# Patient Record
Sex: MALE | Race: WHITE
[De-identification: names, ages, dates, MRNs, and addresses within clinical notes are randomized per-mention and may not be internally consistent; named-entity substitution may affect disease eponyms.]

---

## 2021-02-26 ENCOUNTER — HOSPITAL ENCOUNTER (OUTPATIENT)
Dept: HOSPITAL 47 - RADXRYALE | Age: 61
Discharge: HOME | End: 2021-02-26
Attending: FAMILY MEDICINE
Payer: MEDICARE
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
## 2021-02-26 DIAGNOSIS — M51.36: ICD-10-CM

## 2021-02-26 DIAGNOSIS — M79.622: ICD-10-CM

## 2021-02-26 DIAGNOSIS — M50.30: Primary | ICD-10-CM

## 2021-02-26 DIAGNOSIS — M79.642: ICD-10-CM

## 2021-02-26 PROCEDURE — 72110 X-RAY EXAM L-2 SPINE 4/>VWS: CPT

## 2021-02-26 PROCEDURE — 72050 X-RAY EXAM NECK SPINE 4/5VWS: CPT

## 2021-02-27 NOTE — XR
EXAMINATION TYPE: XR lumbosacral spine min 4V

 

DATE OF EXAM: 2/26/2021

 

CLINICAL HISTORY: ,,[id number] .pain.

 

 

TECHNIQUE: Frontal, lateral, and oblique images of the lumbar spine are obtained.

 

COMPARISON: None

 

FINDINGS:  There are 5 lumbar type vertebral bodies identified. There is grade 1 anterolisthesis of L
4 on L5. There is grade 1 retrolisthesis of L3 on L4. Mild disc space narrowing posterior L3-L4 level
. Mild disc space narrowing L4-L5 level. Mild multilevel anterior and lateral spurring in the mid to 
lower lumbar spine. Oblique images appear within normal limits. Bilateral pars defect L5 level not cl
early identified. Facet arthropathy lower lumbar levels. Mild vascular calcification of the overlying
 abdominal aorta.

 

IMPRESSION: As above.

## 2021-02-27 NOTE — XR
EXAMINATION TYPE: XR cervical spine comp

 

DATE OF EXAM: 2/26/2021

 

TECHNIQUE: Frontal, lateral, oblique, swimmer's, and open mouth view of the cervical spine are obtain
ed.

 

HISTORY:  ,,[id number]

 

COMPARISON: None

 

FINDINGS: There is loss of normal cervical curvature.  There is anterior fusion plate at C6-C7 level 
with some ossific fusion. There is mild to moderate disc space narrowing with moderate to severe ante
rior spurring C3-C4 through the C5-C6 levels. The pre-vertebral soft tissue appears within normal iqbal
its.  The C1-C2 articulation is within normal limits on the open mouth view. Suboptimal evaluation of
 C7-T1 level despite attempted swimmer's view. Slight scoliotic curvature positioning on frontal view
. Oblique images show some multilevel uncovertebral facet spurring contributing to multilevel bilater
al neural foraminal narrowing. Overlying soft tissue is unremarkable.

 

IMPRESSION: As above.

## 2021-02-27 NOTE — XR
EXAMINATION TYPE: XR hand limited LT

 

DATE OF EXAM: 2/26/2021

 

CLINICAL HISTORY: Fall injury 3 weeks ago with pain worse over third finger

 

TECHNIQUE: Frontal and lateral images of the left hand are obtained.

 

COMPARISON: None.

 

FINDINGS:  There is no acute fracture/dislocation evident in the left hand with particular attention 
to the third finger. Mild to moderate narrowing throughout the PIP and DIP joints of the fingers. No 
significant spurring. Relative sparing of MCP joints.  The overlying soft tissue appears unremarkable
.

 

IMPRESSION:  There is no acute fracture or dislocation in the left hand.

## 2021-02-27 NOTE — XR
EXAMINATION TYPE: XR humerus LT

 

DATE OF EXAM: 2/26/2021

 

CLINICAL HISTORY: Pain after fall injury 3 weeks ago.

 

TECHNIQUE:  Two views of the left humerus are obtained.

 

COMPARISON: None.

 

FINDINGS: Osseous structures are demineralized which is noted to lower radiographic sensitivity. In a
ddition there is overlying artifact from clothing material. There is deformity to the humeral head styles
spected and glenohumeral joint arthropathy. Some overlying linear lucency without cortical step off s
uspected artifact. There is however more suspicious focal sclerosis of the intramedullary portion of 
the proximal to mid diaphysis left humerus. Cannot exclude bone lesion at this level. No acute or sub
acute displaced fracture clearly seen.

 

IMPRESSION: As above.  Follow-up advised.

 

A Yellow level critical message alert has been initiated for Robert Joya DO via the [employer] Critical Results System on 2/27/2021 11:36 AM.  This message alert has been sent to Robert bridges DO via the preferences provided by the clinician for the receipt of Radiology Critical Findings
. Message ID [id number].

## 2021-05-17 ENCOUNTER — HOSPITAL ENCOUNTER (OUTPATIENT)
Dept: HOSPITAL 47 - PNWHC3 | Age: 61
End: 2021-05-17
Attending: ANESTHESIOLOGY
Payer: MEDICARE

## 2021-05-17 VITALS
SYSTOLIC BLOOD PRESSURE: 171 MMHG | DIASTOLIC BLOOD PRESSURE: 77 MMHG | RESPIRATION RATE: 16 BRPM | HEART RATE: 78 BPM | TEMPERATURE: 98.2 F

## 2021-05-17 DIAGNOSIS — E78.5: ICD-10-CM

## 2021-05-17 DIAGNOSIS — Z79.01: ICD-10-CM

## 2021-05-17 DIAGNOSIS — M51.36: Primary | ICD-10-CM

## 2021-05-17 DIAGNOSIS — Z79.899: ICD-10-CM

## 2021-05-17 DIAGNOSIS — M48.061: ICD-10-CM

## 2021-05-17 DIAGNOSIS — M47.816: ICD-10-CM

## 2021-05-17 DIAGNOSIS — E11.9: ICD-10-CM

## 2021-05-17 DIAGNOSIS — I10: ICD-10-CM

## 2021-05-17 PROCEDURE — 99211 OFF/OP EST MAY X REQ PHY/QHP: CPT

## 2021-05-17 NOTE — P.PAINCN
History of Present Illness





- Reason for Consult


Consult date: 05/17/21





- History of Present Illness


This is 61 years old male with a chronic history of severe low back pain, after 

several years ago patient the pain is constant and increases with any activity 

interference with the quality of life , she had back surgery in the past and he 

continued to have severe low back pain, patient feels some weakness in his lower

extremity , he ambulates using cane, the intensity of the pain 6/10 increased to

10 over 10 with any activity, denies any change in the bowel movement or 

urination he denies any fever or night sweats





Past Medical History


Past Medical History: Diabetes Mellitus, Eye Disorder, Hyperlipidemia, 

Hypertension


History of Any Multi-Drug Resistant Organisms: None Reported


Past Surgical History: Back Surgery


Additional Past Surgical History / Comment(s): BACK SX X 2.  COLONOSCOPY.  BLE 

VEIN STRIPPING.  RT RETINAL REPAIR


Past Anesthesia/Blood Transfusion Reactions: No Reported Reaction


Past Psychological History: Anxiety


Smoking Status: Never smoker


Past Alcohol Use History: None Reported


Past Drug Use History: None Reported





- Past Family History


  ** Mother


Family Medical History: Cancer





Medications and Allergies


                                Home Medications











 Medication  Instructions  Recorded  Confirmed  Type


 


Apixaban [Eliquis] 5 mg PO BID 05/17/21 05/17/21 History


 


Atorvastatin [Lipitor] 40 mg PO DAILY 05/17/21 05/17/21 History


 


Carvedilol [Coreg] 6.25 mg PO BID 05/17/21 05/17/21 History


 


Cetirizine HCl 10 mg PO DAILY 05/17/21 05/17/21 History


 


Ferrous Sulfate [Iron] 325 mg PO DAILY 05/17/21 05/17/21 History


 


Finasteride [Proscar] 5 mg PO DAILY 05/17/21 05/17/21 History


 


Gabapentin 600 mg PO DAILY 05/17/21 05/17/21 History


 


Magnesium 250 mg PO DAILY 05/17/21 05/17/21 History


 


Spironolactone 25 mg PO DAILY 05/17/21 05/17/21 History


 


Tamsulosin [Flomax] 0.4 mg PO DAILY 05/17/21 05/17/21 History


 


metOLazone [Zaroxolyn] 5 mg PO DAILY 05/17/21 05/17/21 History








                                    Allergies











Allergy/AdvReac Type Severity Reaction Status Date / Time


 


No Known Allergies Allergy   Verified 05/12/21 12:12














Physical Exam


Vitals: 


                                   Vital Signs











  Temp Pulse Resp BP Pulse Ox


 


 05/17/21 09:13  98.2 F  78  16  171/77  98











    


   Physical Examinations  :


    -Constitutiona       : Cooperative , not in acute distress .


    -HEENT                :  nech :  supple ,  no Lymphadenopathy  , normal  

thyroid  size .


                               :   eyes  :  no ptosis , no icterus,  no 

photophobia .                                                                   

                                                                                

                                                                                

                                                                                

                                                                   


    - neurologic         :   Cranial nerve II   to  XII  intact ,  no   focal 

neurological deffecit  .


    -psychatric          : alert ,  oriented  X 3  ,   appropriate affect   , in

tact judgment  and insight  .  


    -Lymphatic          :    no Lymphadenopathy .


   - musculoskeltal   :     


                                 Cervical Spine 


                                         motor  stregnth in the deltoid and 

biceps,   normal   right side    ,  normal  Left side


                                         motor  stregnth biceps and the wrist 

extensors   normal right side ,normal left side .


                                         motor stregnth in the triceps muscle . 

normal  Right side , normal  Left side  


                                         deep tendon reflexes  normal   at the 

biceps ,   normal  at Brachioradialis , normal  at triceps.


                                         cervical facet loading test: Positive 

Bilaterally


                                         Spurling test= positive Right , 

positive left.


                                         Neck distraction test= positive Right ,

 positive left.


                                         Cynthia sign= positive right, positive

 left .


                                   Lumber spine


                                         moter stegnth lower extremities ,thigh 

and legs  4/5 Right side ,  4/5  Left side 


                                         deep tendon reflexes :   normal  Knee 

Jerk    , normal   ankle Jerk  


                                         lumber facet Loading Test =positive 

Right , positive Left 


                                         Range of motion of the lumbar spine  

Flexion  30 degrees,   extension   10 degrees


                                         strait leg raising test = positive at  

 30 degree   


                                          Fabere test= positive Right ,    and  

positive  LT .


                                          Sever tenderness over the  Sacroiliac 

joint  on the Right , and Left  sides   


                                          Gaenslen  test= positive right ,and 

positive left .


                                          Seated flexion test= positive right 

,and positive Left .


                                          Distraction test= positive bilaterally





Results


Comments: 


MRI of the lumbar spine= diffuse lumbar bulging disc disease, central canal 

stenosis, severe bilateral foraminal narrowing, lumbar spondylosis with lumbar 

facet arthropathy.


MRI of the cervical spine= C2 3 facet narrowing, C3 4 facet joint narrowing, and

 C4 5 facet joint arthropathy and central canal narrowing





Assessment and Plan


Plan: 


Assessment and plan=1-lumbar degenerative disc disease.


                               2-lumbar spondylosis with lumbar facet 

arthropathy.


                               3-Lumbar spinal stenosis.


                               4-poorly controlled diabetes.


                               5-chronic use of ELIQUIS


                                Patient will be good candidate to have 

diagnostic medial branch block lumbar area L3,L4, L5 bilaterally , possible RFA


Time with Patient: Greater than 30





PQRS Measure Charge Sheet


Measure #130: Documentation of Current Meds in Medical Chart: Patient's 

medications documented in chart


Measure #226: Tobacco Use: Screen & Cessation Intervention: Pt not a tobacco 

user


Measure #111: Pneumonia Vaccination: Pneumococcal vaccine NOT administered or 

previously given


Measure #47: Advance Care Plan: Advance care planning discussed & documented, pt

 chose/unable to give


Measure #412: Opioid Treatment Agreement: No documentation of signed opioid 

treatment agreement


Measure #408: Opioid Therapy Follow-up Evaluation: Patient had NO f/u eval 

minimum every 3 months during opioid therapy


Measure #317: Preventitive Care & Scrn High Bld Press & F/U: Pre-hypertensive or

 hypertensive BP documented, pt will f/u with PCP


Measure #128: Body Mass Index (BMI) Screening & Follow-up: BMI documented ABOVE 

normal parameters - f/u documented


Measure #131: Pain Assessment & Follow-up: Pain positive & plan documented, 

Follow-up scheduled


Measure #431: Unhealthy Alcohol Use Preventative Care & Scrn: Patient not 

identified as an unhealthy alcohol user


PQRS Narrative: 


                                        





Blood Pressure                   171/77


Pain Intensity [Lower Back]      0


Scale Used                       Numeric (1 - 10)


Hx Alcohol Use (MH)              No








Home Medications: 


Ambulatory Orders





Apixaban [Eliquis] 5 mg PO BID 05/17/21 


Atorvastatin [Lipitor] 40 mg PO DAILY 05/17/21 


Carvedilol [Coreg] 6.25 mg PO BID 05/17/21 


Cetirizine HCl 10 mg PO DAILY 05/17/21 


Ferrous Sulfate [Iron] 325 mg PO DAILY 05/17/21 


Finasteride [Proscar] 5 mg PO DAILY 05/17/21 


Gabapentin 600 mg PO DAILY 05/17/21 


Magnesium 250 mg PO DAILY 05/17/21 


Spironolactone 25 mg PO DAILY 05/17/21 


Tamsulosin [Flomax] 0.4 mg PO DAILY 05/17/21 


metOLazone [Zaroxolyn] 5 mg PO DAILY 05/17/21

## 2021-06-29 ENCOUNTER — HOSPITAL ENCOUNTER (OUTPATIENT)
Dept: HOSPITAL 47 - ORPAIN | Age: 61
Discharge: HOME | End: 2021-06-29
Attending: ANESTHESIOLOGY
Payer: MEDICARE

## 2021-06-29 VITALS — RESPIRATION RATE: 16 BRPM | TEMPERATURE: 98.1 F

## 2021-06-29 VITALS — HEART RATE: 72 BPM

## 2021-06-29 VITALS — BODY MASS INDEX: 45.8 KG/M2

## 2021-06-29 VITALS — SYSTOLIC BLOOD PRESSURE: 125 MMHG | DIASTOLIC BLOOD PRESSURE: 60 MMHG

## 2021-06-29 DIAGNOSIS — G89.29: Primary | ICD-10-CM

## 2021-06-29 DIAGNOSIS — M54.5: ICD-10-CM

## 2021-06-29 DIAGNOSIS — M51.36: ICD-10-CM

## 2021-06-29 DIAGNOSIS — M47.816: ICD-10-CM

## 2021-06-29 LAB
GLUCOSE BLD-MCNC: 64 MG/DL (ref 75–99)
GLUCOSE BLD-MCNC: 84 MG/DL (ref 75–99)
GLUCOSE BLD-MCNC: 97 MG/DL (ref 75–99)

## 2021-06-29 PROCEDURE — 64493 INJ PARAVERT F JNT L/S 1 LEV: CPT

## 2021-06-29 PROCEDURE — 64494 INJ PARAVERT F JNT L/S 2 LEV: CPT

## 2021-06-29 PROCEDURE — 99152 MOD SED SAME PHYS/QHP 5/>YRS: CPT

## 2021-06-29 NOTE — P.PCN
Date of Procedure: 06/29/21


Procedure(s) Performed: 














PREOPERATIVE DIAGNOSIS   :   1-  Lumbar spondylosis with  Facet Arthropathy 

without myelopathy .  2- Lumber degenerative disc disease





POSTOPERATIVE DIAGNOSIS:   1-  Lumbar spondylosis with  Facet Arthropathy 

without myelopathy .  2- Lumber degenerative disc disease


 


PROCEDURE: Diagnostic  bilateral L3  , L4  , and L5 medial branch block under  

fluoroscopy guidance(fluoroscopy images available in the radiology Department )


                                ( To target the facet joint between bilateral  

L4-5 , and L5-S1 )





ANESTHESIA:, moderate sedation with intravenous Versed  1 mg and Fentanyl 50   

mcg.


EBL: Minimal





COMPLICATION: None


 


PROCEDURE INDICATION: Chronic low back pain secondary to Facet arthropathy 

unresponsive to conservative treatment.  





PROCEDURE DESCRIPTION:  the patient was seen and identified in the preop holding

area , risks and benefits and possible complications of the procedure and 

alternative                                                                     

                                                                                

                                                                                

                                            were discussed with the patient, and

the patient agreed  to proceed with the procedure and signed the consent        

                                                                                

                                                 and vital signs monitored 

during the  procedure and fluoroscopy was used to maximize the benefit and 

accuracy of the needle placement, and sedation was given to decrease patient  

anxiety, patient was taken to the procedure room and placed in prone position 

vital signs monitored in the back prepped with chlorhexidine X3 then under 

strict sterile technique using a right oblique fluoroscopy ,the  junction of the

transverse process and the superior articulating process of the right L3 , L4 , 

and L5  vertebra which corresponding to the fluoroscopy image of the eye of the 

Riley dog on the block side for the medial branches and subsequently , after 

local infiltration of skin and subcu tissuies with Ropivacaine 0.5 %  , one mL  

at  each level ,then  22-gauge 5 inches long  Quincke-type needles , 3 needle 

was used  , each one of them placed at the junction of the base of the 

transverse process and the superior articular process at the appropriate level, 

and the needle was advanced until the periosteum contacted, needle placement 

confirmed with AP oblique and lateral view and after appropriate needle 

placement confirmed, and after negative aspiration for heme and CSF and there 

was no paresthesia 1-1/2 mL of Ropivacaine 0.5% mixed with 20 mg Depo-Medrol , 

then  half mL injected at each level after negative aspiration the needle 

subsequently removed and the same procedure repeated for the left side at left 

side at  L3 , L4 and L5 levels.


At the end of the procedure and the needles removed and a bandage applied after 

the skin was cleaned the cleaning solution patient taken to recovery room in 

stable condition and monitors in the recovery room for 20-30 minutes and 

discharged home in stable condition after discharge criteria met and patient 

will follow up with the pain clinic in 2-4 weeks
Normal

## 2021-06-29 NOTE — FL
EXAMINATION TYPE: FL guided pain mgmt statistic

 

DATE OF EXAM: 6/29/2021

 

HISTORY: Pain, Jose Lumbar Facet 

 

 

19sec fluoro time

4 images to PACS

## 2021-07-14 ENCOUNTER — HOSPITAL ENCOUNTER (OUTPATIENT)
Dept: HOSPITAL 47 - PNWHC3 | Age: 61
End: 2021-07-14
Attending: ANESTHESIOLOGY
Payer: MEDICARE

## 2021-07-14 VITALS
SYSTOLIC BLOOD PRESSURE: 147 MMHG | DIASTOLIC BLOOD PRESSURE: 69 MMHG | TEMPERATURE: 97.8 F | HEART RATE: 97 BPM | RESPIRATION RATE: 18 BRPM

## 2021-07-14 DIAGNOSIS — E66.01: ICD-10-CM

## 2021-07-14 DIAGNOSIS — M48.061: ICD-10-CM

## 2021-07-14 DIAGNOSIS — M47.816: ICD-10-CM

## 2021-07-14 DIAGNOSIS — E11.9: ICD-10-CM

## 2021-07-14 DIAGNOSIS — M51.36: Primary | ICD-10-CM

## 2021-07-14 DIAGNOSIS — Z79.01: ICD-10-CM

## 2021-07-14 PROCEDURE — 99211 OFF/OP EST MAY X REQ PHY/QHP: CPT

## 2021-07-14 NOTE — P.PN
Subjective


Progress Note Date: 07/14/21





This is 61 years old male with a chronic history of severe low back pain, for 

several years ago patient the pain is constant and increases with any activity 

interference with the quality of life , patient feels some weakness in his lower

extremity , he ambulates using cane, the intensity of the pain 6/10 increased to

10 over 10 with any activity, denies any change in the bowel movement or 

urination he denies any fever or night sweats, recently we have done diagnostic 

medial branch block lumbar area, patient reported that he did benefited only 40%

after the block





   Physical Examinations  :


    -Constitutiona       : Cooperative , not in acute distress .


    -HEENT                :  nech :  supple ,  no Lymphadenopathy  , normal  

thyroid  size .


                               :   eyes  :  no ptosis , no icterus,  no 

photophobia .                                                                   

                                                                                

                                                                                

                                                                                

                                                               


    - neurologic         :   Cranial nerve II   to  XII  intact ,  no   focal 

neurological deffecit  .


    -psychatric          : alert ,  oriented  X 3  ,   appropriate affect   , 

intact judgment  and insight  .  


    -Lymphatic          :    no Lymphadenopathy .


   - musculoskeltal   :     


                                 Cervical Spine 


                                         motor  stregnth in the deltoid and 

biceps,   normal   right side    ,  normal  Left side


                                         motor  stregnth biceps and the wrist 

extensors   normal right side ,normal left side .


                                         motor stregnth in the triceps muscle . 

normal  Right side , normal  Left side  


                                         deep tendon reflexes  normal   at the 

biceps ,   normal  at Brachioradialis , normal  at triceps.


                                         cervical facet loading test: Positive 

Bilaterally


                                         Spurling test= positive Right , 

positive left.


                                         Neck distraction test= positive Right ,

positive left.


                                         Cynthia sign= positive right, positive

left .


                                   Lumber spine


                                         moter stegnth lower extremities ,thigh 

and legs  4/5 Right side ,  4/5  Left side 


                                         deep tendon reflexes :   normal  Knee 

Jerk    , normal   ankle Jerk  


                                         lumber facet Loading Test =positive 

Right , positive Left 


                                         Range of motion of the lumbar spine  

Flexion  30 degrees,   extension   10 degrees


                                         strait leg raising test = positive at  

30 degree   


                                          Fabere test= positive Right ,    and  

positive  LT .


                                          Sever tenderness over the  Sacroiliac 

joint  on the Right , and Left  sides   


                                          Gaenslen  test= positive right ,and 

positive left .


                                          Seated flexion test= positive right 

,and positive Left .


                                          Distraction test= positive bilaterally





Results


MRI of the lumbar spine= diffuse lumbar bulging disc disease, central canal 

stenosis, severe bilateral foraminal narrowing, lumbar spondylosis with lumbar 

facet arthropathy.


MRI of the cervical spine= C2 3 facet narrowing, C3 4 facet joint narrowing, and

C4 5 facet joint arthropathy and central canal narrowing





Assessment and plan=1-lumbar degenerative disc disease.


                               2-lumbar spondylosis with lumbar facet 

arthropathy.


                               3-Lumbar spinal stenosis.


                               4-morbid obesity ,poorly controlled diabetes.


                               5-chronic use of ELIQUIS


                                Patiens had no benefit from diagnostic medial 

branch block lumbar area L3,L4, L5 


                                he could benefit from lumbar epidural steroid 

injection at L4 5 , Asian tattoo hold liquids for 3 days before the procedure


                                The patient referred to have consultation with 

bariatric surgery





PQRS Measure Charge Sheet


Measure #130: Documentation of Current Meds in Medical Chart: Patient's 

medications documented in chart


Measure #226: Tobacco Use: Screen & Cessation Intervention: Pt not a tobacco 

user


Measure #111: Pneumonia Vaccination: Pneumococcal vaccine NOT administered or 

previously given


Measure #47: Advance Care Plan: Advance care planning discussed & documented, pt

chose/unable to give


Measure #412: Opioid Treatment Agreement: No documentation of signed opioid 

treatment agreement


Measure #408: Opioid Therapy Follow-up Evaluation: Patient had NO f/u eval 

minimum every 3 months during opioid therapy


Measure #317: Preventitive Care & Scrn High Bld Press & F/U: Pre-hypertensive or

hypertensive BP documented, pt will f/u with PCP


Measure #128: Body Mass Index (BMI) Screening & Follow-up: BMI documented ABOVE 

normal parameters - f/u documented


Measure #131: Pain Assessment & Follow-up: Pain positive & plan documented, Fol

low-up scheduled


Measure #431: Unhealthy Alcohol Use Preventative Care & Scrn: Patient not 

identified as an unhealthy alcohol user


PQRS Narrative: 





Objective





- Vital Signs


Vital signs: 


                                   Vital Signs











Temp  97.8 F   07/14/21 12:54


 


Pulse  97   07/14/21 12:54


 


Resp  18   07/14/21 12:54


 


BP  147/69   07/14/21 12:54


 


Pulse Ox  95   07/14/21 12:54








                                 Intake & Output











 07/13/21 07/14/21 07/14/21





 18:59 06:59 18:59


 


Weight 149.232 kg

## 2021-10-12 ENCOUNTER — HOSPITAL ENCOUNTER (OUTPATIENT)
Dept: HOSPITAL 47 - ORPAIN | Age: 61
Discharge: HOME | End: 2021-10-12
Attending: ANESTHESIOLOGY
Payer: MEDICARE

## 2021-10-12 VITALS — TEMPERATURE: 97.9 F | RESPIRATION RATE: 18 BRPM

## 2021-10-12 VITALS — HEART RATE: 72 BPM | DIASTOLIC BLOOD PRESSURE: 77 MMHG | SYSTOLIC BLOOD PRESSURE: 148 MMHG

## 2021-10-12 VITALS — BODY MASS INDEX: 45.4 KG/M2

## 2021-10-12 DIAGNOSIS — M51.16: Primary | ICD-10-CM

## 2021-10-12 LAB
GLUCOSE BLD-MCNC: 109 MG/DL (ref 75–99)
GLUCOSE BLD-MCNC: 98 MG/DL (ref 75–99)

## 2021-10-12 PROCEDURE — 62323 NJX INTERLAMINAR LMBR/SAC: CPT

## 2021-10-12 PROCEDURE — 99152 MOD SED SAME PHYS/QHP 5/>YRS: CPT

## 2021-10-12 NOTE — P.PCN
Date of Procedure: 10/12/21


Description of Procedure: 


Procedure: 


1. L4-L5 is Epidural steroid injection under fluoroscopic guidance #1, 


2.   Lumbar epidurogram





PREOPERATIVE DIAGNOSIS:  Lumbar degenerative disc disease, and Lumbar 

radiculopathy.





POSTOPERATIVE DIAGNOSIS: Lumbar degenerative disc disease, and Lumbar 

radiculopathy.





SURGEON: Rea Lugo 





ANESTHESIA: Local with 1% lidocaine, and IV sedation as per anesthesia record





EBL: None.





Specimen removed: None





Fluoroscopic image: saved to electronic medical records





PROCEDURE INDICATION:  The patient had history of Lumbar degenerative disc 

disease and Lumbar radiculopathy. Failed to conservative therapy. Presented for 

epidural steroid injection.





PROCEDURE DESCRIPTION: The patient was seen and identified in the preoperative 

area. Risks, benefits, complications, and alternatives were discussed with the 

patient. The patient agreed to proceed with the procedure and signed the 

consent. IV was started, and vital signs were stable.





Patient was taken to the procedure area, and time out was completed. The patient

was placed in the prone position on procedure table and a pillow was placed 

under the abdomen to reduce lumbar lordosis. The lumbosacral area was prepped 

and draped in the usual sterile fashion. Critical pause was taken. Vital signs 

were closely monitored during the procedure.





Using anterior-posterior fluoroscopy, the L4-L5 interlaminar space was 

identified, and skin and deeper tissues were localized with 1% lidocaine. Using 

anterior-posterior fluoroscopy, lateral fluoroscopy, and loss-of-resistance 

technique, a 18 gauge 6 inch Tuohy epidural needle entered the epidural space. 

After negative aspiration of CSF and blood with no paresthesias, 1 ml of 

Ypfspf150 contrast dye was injected and an excellent epidurogram was seen. Again

after negative aspiration of CSF and blood with no paresthesias, 10 mL of block 

solution was injected into the epidural space. Block solution contained 40 mg of

Depo-Medrol, and 9 mL of preservative-free normal saline. Needle was withdrawn 

intact, skin was cleansed, and bandages were applied. 





COMPLICATIONS: None.





DISPOSITION / PLANS: The patient was placed in a supine position and transferred

to the recovery area in a stable condition for observation. Patient was 

discharged from the recovery room after meeting discharge criteria. Home 

discharge instructions given to the patient by the staff. The patient was 

reexamined prior to discharge. The patient will schedule a follow up in the 

clinic in 4 weeks.

## 2021-10-12 NOTE — FL
EXAMINATION TYPE: FL guided pain mgmt statistic

 

DATE OF EXAM: 10/12/2021

 

CLINICAL HISTORY: Low back pain.

 

TECHNIQUE: Fluoroscopy.

 

COMPARISON:  None.

 

FINDINGS:  Fluoroscopic guidance was provided during pain relief procedure performed by Dr. Lugo. 
 A total of 4 seconds of fluoroscopic time was utilized during the procedure and two spot images are 
acquired. Images acquired shows needle localization  at the L4-L5 level.

 

IMPRESSION:  As Above.